# Patient Record
Sex: FEMALE | Race: WHITE | NOT HISPANIC OR LATINO
[De-identification: names, ages, dates, MRNs, and addresses within clinical notes are randomized per-mention and may not be internally consistent; named-entity substitution may affect disease eponyms.]

---

## 2022-03-22 ENCOUNTER — APPOINTMENT (OUTPATIENT)
Dept: RHEUMATOLOGY | Facility: CLINIC | Age: 47
End: 2022-03-22
Payer: COMMERCIAL

## 2022-03-22 VITALS
HEIGHT: 66 IN | BODY MASS INDEX: 26.84 KG/M2 | WEIGHT: 167 LBS | HEART RATE: 71 BPM | OXYGEN SATURATION: 98 % | SYSTOLIC BLOOD PRESSURE: 166 MMHG | TEMPERATURE: 97.9 F | RESPIRATION RATE: 14 BRPM | DIASTOLIC BLOOD PRESSURE: 100 MMHG

## 2022-03-22 DIAGNOSIS — Z82.5 FAMILY HISTORY OF ASTHMA AND OTHER CHRONIC LOWER RESPIRATORY DISEASES: ICD-10-CM

## 2022-03-22 DIAGNOSIS — E11.9 TYPE 2 DIABETES MELLITUS W/OUT COMPLICATIONS: ICD-10-CM

## 2022-03-22 DIAGNOSIS — Z82.61 FAMILY HISTORY OF ARTHRITIS: ICD-10-CM

## 2022-03-22 DIAGNOSIS — E78.5 HYPERLIPIDEMIA, UNSPECIFIED: ICD-10-CM

## 2022-03-22 DIAGNOSIS — Z78.9 OTHER SPECIFIED HEALTH STATUS: ICD-10-CM

## 2022-03-22 DIAGNOSIS — R79.89 OTHER SPECIFIED ABNORMAL FINDINGS OF BLOOD CHEMISTRY: ICD-10-CM

## 2022-03-22 DIAGNOSIS — I10 ESSENTIAL (PRIMARY) HYPERTENSION: ICD-10-CM

## 2022-03-22 DIAGNOSIS — Z11.59 ENCOUNTER FOR SCREENING FOR OTHER VIRAL DISEASES: ICD-10-CM

## 2022-03-22 PROCEDURE — 99204 OFFICE O/P NEW MOD 45 MIN: CPT

## 2022-03-22 RX ORDER — HYDROCHLOROTHIAZIDE 25 MG/1
25 TABLET ORAL
Refills: 0 | Status: ACTIVE | COMMUNITY
Start: 2022-03-22

## 2022-03-22 RX ORDER — MULTIVIT-MIN/FOLIC/VIT K/LYCOP 400-300MCG
25 MCG TABLET ORAL
Refills: 0 | Status: ACTIVE | COMMUNITY
Start: 2022-03-22

## 2022-03-22 RX ORDER — METFORMIN HYDROCHLORIDE 500 MG/1
500 TABLET, COATED ORAL
Refills: 0 | Status: ACTIVE | COMMUNITY
Start: 2022-03-22

## 2022-03-22 RX ORDER — ATORVASTATIN CALCIUM 40 MG/1
40 TABLET, FILM COATED ORAL
Refills: 0 | Status: ACTIVE | COMMUNITY
Start: 2022-03-22

## 2022-03-22 RX ORDER — METOPROLOL TARTRATE 50 MG/1
50 TABLET, FILM COATED ORAL
Refills: 0 | Status: ACTIVE | COMMUNITY
Start: 2022-03-22

## 2022-03-22 NOTE — ASSESSMENT
[FreeTextEntry1] : 45 y/o F w PsO, likely PsA\par =hx of PsO-skin in remission\par =pain, swelling of L wrists, hand, elbow, shoulder, knee\par =dactylitis of L hand? \par =on TNF inhibitor \par \par Pt likely has PsA given clinical presentation. Pt has been on 2 TNF inhibitors, and will likely need another medicaiton with different mechanism of action. IL17 inhibitor would be good candidate (cosentyx, taltz). Will obtain labs to prepare for immunosuppression. Doubt pt has drug induced lupus, but will send for serologies, given use of TNF inhibitor. \par \par Plan\par -Labs w serologies, inflammatory markers\par -Xrays hands/wrists, knees, feet/ankles \par RTO in 2 weeks to discuss dx and treatment plan

## 2022-03-22 NOTE — HISTORY OF PRESENT ILLNESS
[FreeTextEntry1] : Referring physician: Dr. Erick Roberts \par \par 45 y/o F here for initial visit. \par Pt dx w PsO 13 years ago. Pt says all skin was affected. Was initially on enbrel, which did not help. However, pt then developed joint pain 1 month ago. Pt pain is L elbow, L wrist/hand, b/l knee pain. Pt says she also had skin flare. \par Pt was put on medrol, but it only helped her temporarily. Pt was put on meloxicam, naproxen, which did not help.\par Joint pain is worse in am. Pain is worse w activities. She has joint swelling. \par Pt then 1 month ago, started developing joint pain. \par \par Pt on humira for last 6-7 years ago. \par Pt took ebrel for years. It did not work. \par \par No fevers, h/a, red painful, eyes, nail changes,  hair loss, oral ulcers, epistaxis, sinusitis,  swollen glands, dry mouth, dry eyes, CP,  cough, vision changes, abdominal pain, GERD, n/v/d, blood in stool or urine, focal weakness, sensory loss,  Raynaud's, weight loss. \par +pt w difficulty breathing from pain \par +abdominal pain\par +numbness, tinging of L hand

## 2022-03-22 NOTE — CONSULT LETTER
[Dear  ___] : Dear  [unfilled], [Consult Letter:] : I had the pleasure of evaluating your patient, [unfilled]. [Consult Closing:] : Thank you very much for allowing me to participate in the care of this patient.  If you have any questions, please do not hesitate to contact me. [Sincerely,] : Sincerely, [FreeTextEntry2] : Dr. Erick Roberts \par [address], \[place] [address] [FreeTextEntry3] : Kamaljit Groves MD

## 2022-03-22 NOTE — PHYSICAL EXAM
[General Appearance - Well Nourished] : well nourished [General Appearance - Well Developed] : well developed [Sclera] : the sclera and conjunctiva were normal [Auscultation Breath Sounds / Voice Sounds] : lungs were clear to auscultation bilaterally [Heart Sounds] : normal S1 and S2 [Murmurs] : no murmurs [Heart Sounds Gallop] : no gallops [Abdomen Soft] : soft [Heart Sounds Pericardial Friction Rub] : no pericardial rub [Abdomen Tenderness] : non-tender [] : no hepato-splenomegaly [FreeTextEntry1] : no significant PsO lesions. Nails intact.  [Oriented To Time, Place, And Person] : oriented to person, place, and time

## 2022-03-24 DIAGNOSIS — M19.90 UNSPECIFIED OSTEOARTHRITIS, UNSPECIFIED SITE: ICD-10-CM

## 2022-03-24 LAB
25(OH)D3 SERPL-MCNC: 34.2 NG/ML
ALBUMIN SERPL ELPH-MCNC: 4.7 G/DL
ALP BLD-CCNC: 52 U/L
ALT SERPL-CCNC: 15 U/L
ANION GAP SERPL CALC-SCNC: 16 MMOL/L
AST SERPL-CCNC: 12 U/L
BASOPHILS # BLD AUTO: 0.06 K/UL
BASOPHILS NFR BLD AUTO: 0.6 %
BILIRUB SERPL-MCNC: 0.3 MG/DL
BUN SERPL-MCNC: 16 MG/DL
CALCIUM SERPL-MCNC: 10.4 MG/DL
CCP AB SER IA-ACNC: <8 UNITS
CHLORIDE SERPL-SCNC: 95 MMOL/L
CO2 SERPL-SCNC: 26 MMOL/L
CREAT SERPL-MCNC: 0.47 MG/DL
CRP SERPL-MCNC: 26 MG/L
DSDNA AB SER-ACNC: 16 IU/ML
EGFR: 120 ML/MIN/1.73M2
EOSINOPHIL # BLD AUTO: 0.13 K/UL
EOSINOPHIL NFR BLD AUTO: 1.2 %
ERYTHROCYTE [SEDIMENTATION RATE] IN BLOOD BY WESTERGREN METHOD: 70 MM/HR
GLUCOSE SERPL-MCNC: 155 MG/DL
HBV CORE IGG+IGM SER QL: NONREACTIVE
HBV SURFACE AB SER QL: NONREACTIVE
HBV SURFACE AG SER QL: NONREACTIVE
HCT VFR BLD CALC: 40 %
HCV AB SER QL: NONREACTIVE
HCV S/CO RATIO: 0.15 S/CO
HGB BLD-MCNC: 13.2 G/DL
HISTONE AB SER QL: 0.3 UNITS
IMM GRANULOCYTES NFR BLD AUTO: 0.6 %
LYMPHOCYTES # BLD AUTO: 3.2 K/UL
LYMPHOCYTES NFR BLD AUTO: 30.2 %
M TB IFN-G BLD-IMP: NEGATIVE
MAN DIFF?: NORMAL
MCHC RBC-ENTMCNC: 27.8 PG
MCHC RBC-ENTMCNC: 33 GM/DL
MCV RBC AUTO: 84.4 FL
MONOCYTES # BLD AUTO: 0.8 K/UL
MONOCYTES NFR BLD AUTO: 7.6 %
NEUTROPHILS # BLD AUTO: 6.34 K/UL
NEUTROPHILS NFR BLD AUTO: 59.8 %
PLATELET # BLD AUTO: 491 K/UL
POTASSIUM SERPL-SCNC: 4.9 MMOL/L
PROT SERPL-MCNC: 8.5 G/DL
QUANTIFERON TB PLUS MITOGEN MINUS NIL: 9.96 IU/ML
QUANTIFERON TB PLUS NIL: 0.04 IU/ML
QUANTIFERON TB PLUS TB1 MINUS NIL: 0.01 IU/ML
QUANTIFERON TB PLUS TB2 MINUS NIL: 0.01 IU/ML
RBC # BLD: 4.74 M/UL
RBC # FLD: 13.1 %
RF+CCP IGG SER-IMP: NEGATIVE
RHEUMATOID FACT SER QL: 11 IU/ML
SODIUM SERPL-SCNC: 136 MMOL/L
WBC # FLD AUTO: 10.59 K/UL

## 2022-03-29 LAB
ANA PAT FLD IF-IMP: NORMAL
ANA SER IF-ACNC: ABNORMAL

## 2022-03-29 RX ORDER — SECUKINUMAB 150 MG/ML
150 INJECTION SUBCUTANEOUS
Qty: 5 | Refills: 0 | Status: DISCONTINUED | COMMUNITY
Start: 2022-03-24 | End: 2022-03-29

## 2022-03-29 RX ORDER — SECUKINUMAB 150 MG/ML
150 INJECTION SUBCUTANEOUS
Qty: 1 | Refills: 5 | Status: DISCONTINUED | COMMUNITY
Start: 2022-03-24 | End: 2022-03-29

## 2022-04-04 LAB — HLA-B27 RELATED AG QL: NEGATIVE

## 2022-04-12 ENCOUNTER — APPOINTMENT (OUTPATIENT)
Dept: RHEUMATOLOGY | Facility: CLINIC | Age: 47
End: 2022-04-12
Payer: COMMERCIAL

## 2022-04-12 VITALS
RESPIRATION RATE: 14 BRPM | HEART RATE: 71 BPM | OXYGEN SATURATION: 98 % | DIASTOLIC BLOOD PRESSURE: 99 MMHG | TEMPERATURE: 98.1 F | SYSTOLIC BLOOD PRESSURE: 172 MMHG

## 2022-04-12 PROCEDURE — 96372 THER/PROPH/DIAG INJ SC/IM: CPT

## 2022-04-12 NOTE — PROCEDURE
[Today's Date:] : Date: [unfilled] [Patient] : the patient [Risks] : risks [Benefits] : benefits [Therapeutic] : therapeutic [#1 Site: ______] : #1 site identified in the [unfilled] [Alcohol] : alcohol [22 gauge 1.5 inch] : A 22 gauge 1.5 inch needle was used [Tolerated Well] : the patient tolerated the procedure well [No Complications] : there were no complications [Instructions Given] : handouts/patient instructions were given to patient [Patient Instructed to Call] : patient was instructed to call if redness at site, a decrease in range of motion or an increase in pain is noted after procedure. [de-identified] : stelara 45mg -outside pharmacy

## 2022-04-20 ENCOUNTER — NON-APPOINTMENT (OUTPATIENT)
Age: 47
End: 2022-04-20

## 2022-04-20 ENCOUNTER — RX RENEWAL (OUTPATIENT)
Age: 47
End: 2022-04-20

## 2022-05-10 ENCOUNTER — APPOINTMENT (OUTPATIENT)
Dept: RHEUMATOLOGY | Facility: CLINIC | Age: 47
End: 2022-05-10
Payer: COMMERCIAL

## 2022-05-10 VITALS
DIASTOLIC BLOOD PRESSURE: 90 MMHG | HEIGHT: 66 IN | WEIGHT: 171 LBS | HEART RATE: 71 BPM | SYSTOLIC BLOOD PRESSURE: 161 MMHG | BODY MASS INDEX: 27.48 KG/M2 | OXYGEN SATURATION: 98 % | TEMPERATURE: 98.2 F | RESPIRATION RATE: 14 BRPM

## 2022-05-10 PROCEDURE — 99214 OFFICE O/P EST MOD 30 MIN: CPT | Mod: 25

## 2022-05-10 PROCEDURE — 96401 CHEMO ANTI-NEOPL SQ/IM: CPT

## 2022-05-10 NOTE — DATA REVIEWED
[FreeTextEntry1] : Labs reviewed from 3/22\par LUIS ARMANDO 1:320, ESR 70, CRP 29\par WBC 10.5, plt 400s \par RF, CCP, HLAB27 negative \par \par Xrays 3/22\par hands/wrists: mild OA \par knees: mild OA \par feet/ankles: mild OA

## 2022-05-10 NOTE — PROCEDURE
[Today's Date:] : Date: [unfilled] [Patient] : the patient [Risks] : risks [Benefits] : benefits [Therapeutic] : therapeutic [Alcohol] : alcohol [22 gauge 1.5 inch] : A 22 gauge 1.5 inch needle was used [Tolerated Well] : the patient tolerated the procedure well [No Complications] : there were no complications [Instructions Given] : handouts/patient instructions were given to patient [Patient Instructed to Call] : patient was instructed to call if redness at site, a decrease in range of motion or an increase in pain is noted after procedure. [#1 Site: ______] : #1 site identified in the [unfilled] [de-identified] : stelara 45 mg outside pharmacy

## 2022-05-10 NOTE — PHYSICAL EXAM
[General Appearance - Well Nourished] : well nourished [General Appearance - Well Developed] : well developed [Sclera] : the sclera and conjunctiva were normal [Auscultation Breath Sounds / Voice Sounds] : lungs were clear to auscultation bilaterally [Heart Sounds] : normal S1 and S2 [Heart Sounds Gallop] : no gallops [Murmurs] : no murmurs [Heart Sounds Pericardial Friction Rub] : no pericardial rub [Abdomen Soft] : soft [Abdomen Tenderness] : non-tender [] : no hepato-splenomegaly [Musculoskeletal - Swelling] : no joint swelling seen [Oriented To Time, Place, And Person] : oriented to person, place, and time [FreeTextEntry1] : no significant PsO lesions. Nails intact.

## 2022-05-10 NOTE — ASSESSMENT
[FreeTextEntry1] : 47 y/o F w PsO, likely PsA\par =hx of PsO-skin in remission\par =pain, swelling of L wrists, hand, elbow, shoulder, knee\par =dactylitis of L hand\par =labs 4/22 w high ECR, CRP; negative HLAB27, RF, CCP negative\par =Xrays 4/22 w mild hand, knees, feet OA\par =on stelara \par \par Pt improving, but unsure if steroids controlling PsA currently. WIll try to taper furtehr.\par \par Plan \par Labs to measure disease activity and monitor for drug toxicities \par injected stelara per above\par lower prednisone to 7.5mg daily\par naproxen 250 mg QD PRN \par PPI for GI protection \par RTO In 4-6 weeks

## 2022-05-10 NOTE — HISTORY OF PRESENT ILLNESS
[___ Month(s) Ago] : [unfilled] month(s) ago [FreeTextEntry1] : =pt feels well, has decreased swelling and pain of joints\par =pt feels when she stops prednisone, L knee pain returns\par =no active skin lesions\par =red painful eyes, fevers, rashes, psoriasis, back pain, diarrhea, blood in stool

## 2022-05-14 LAB
ALBUMIN SERPL ELPH-MCNC: 4.5 G/DL
ALP BLD-CCNC: 47 U/L
ALT SERPL-CCNC: 24 U/L
ANION GAP SERPL CALC-SCNC: 16 MMOL/L
AST SERPL-CCNC: 17 U/L
BASOPHILS # BLD AUTO: 0.03 K/UL
BASOPHILS NFR BLD AUTO: 0.4 %
BILIRUB SERPL-MCNC: 0.3 MG/DL
BUN SERPL-MCNC: 18 MG/DL
CALCIUM SERPL-MCNC: 9 MG/DL
CHLORIDE SERPL-SCNC: 98 MMOL/L
CO2 SERPL-SCNC: 24 MMOL/L
CREAT SERPL-MCNC: 0.62 MG/DL
CRP SERPL-MCNC: <3 MG/L
EGFR: 113 ML/MIN/1.73M2
EOSINOPHIL # BLD AUTO: 0.06 K/UL
EOSINOPHIL NFR BLD AUTO: 0.8 %
ERYTHROCYTE [SEDIMENTATION RATE] IN BLOOD BY WESTERGREN METHOD: 28 MM/HR
GLUCOSE SERPL-MCNC: 212 MG/DL
HCT VFR BLD CALC: 37.3 %
HGB BLD-MCNC: 12.2 G/DL
IMM GRANULOCYTES NFR BLD AUTO: 0.7 %
LYMPHOCYTES # BLD AUTO: 2.02 K/UL
LYMPHOCYTES NFR BLD AUTO: 27.8 %
MAN DIFF?: NORMAL
MCHC RBC-ENTMCNC: 27.8 PG
MCHC RBC-ENTMCNC: 32.7 GM/DL
MCV RBC AUTO: 85 FL
MONOCYTES # BLD AUTO: 0.57 K/UL
MONOCYTES NFR BLD AUTO: 7.9 %
NEUTROPHILS # BLD AUTO: 4.53 K/UL
NEUTROPHILS NFR BLD AUTO: 62.4 %
PLATELET # BLD AUTO: 359 K/UL
POTASSIUM SERPL-SCNC: 4.3 MMOL/L
PROT SERPL-MCNC: 7.1 G/DL
RBC # BLD: 4.39 M/UL
RBC # FLD: 13.3 %
SODIUM SERPL-SCNC: 138 MMOL/L
WBC # FLD AUTO: 7.26 K/UL

## 2022-05-17 ENCOUNTER — APPOINTMENT (OUTPATIENT)
Dept: RHEUMATOLOGY | Facility: CLINIC | Age: 47
End: 2022-05-17

## 2022-06-07 ENCOUNTER — RX RENEWAL (OUTPATIENT)
Age: 47
End: 2022-06-07

## 2022-06-22 ENCOUNTER — RX RENEWAL (OUTPATIENT)
Age: 47
End: 2022-06-22

## 2022-06-27 ENCOUNTER — APPOINTMENT (OUTPATIENT)
Dept: RHEUMATOLOGY | Facility: CLINIC | Age: 47
End: 2022-06-27

## 2022-07-05 ENCOUNTER — APPOINTMENT (OUTPATIENT)
Dept: RHEUMATOLOGY | Facility: CLINIC | Age: 47
End: 2022-07-05

## 2022-07-06 ENCOUNTER — RX RENEWAL (OUTPATIENT)
Age: 47
End: 2022-07-06

## 2022-08-22 ENCOUNTER — APPOINTMENT (OUTPATIENT)
Dept: RHEUMATOLOGY | Facility: CLINIC | Age: 47
End: 2022-08-22

## 2022-08-22 ENCOUNTER — RX RENEWAL (OUTPATIENT)
Age: 47
End: 2022-08-22

## 2022-08-22 RX ORDER — NAPROXEN 250 MG/1
250 TABLET ORAL
Qty: 60 | Refills: 1 | Status: ACTIVE | COMMUNITY
Start: 2022-04-12 | End: 1900-01-01

## 2022-09-07 ENCOUNTER — RX RENEWAL (OUTPATIENT)
Age: 47
End: 2022-09-07

## 2022-09-19 ENCOUNTER — APPOINTMENT (OUTPATIENT)
Dept: RHEUMATOLOGY | Facility: CLINIC | Age: 47
End: 2022-09-19

## 2022-09-19 VITALS
BODY MASS INDEX: 26.2 KG/M2 | OXYGEN SATURATION: 98 % | HEIGHT: 66 IN | TEMPERATURE: 97.9 F | WEIGHT: 163 LBS | SYSTOLIC BLOOD PRESSURE: 158 MMHG | DIASTOLIC BLOOD PRESSURE: 96 MMHG | HEART RATE: 72 BPM | RESPIRATION RATE: 15 BRPM

## 2022-09-19 PROCEDURE — 99214 OFFICE O/P EST MOD 30 MIN: CPT

## 2022-09-19 RX ORDER — PREDNISONE 5 MG/1
5 TABLET ORAL
Qty: 100 | Refills: 1 | Status: ACTIVE | COMMUNITY
Start: 2022-09-19 | End: 1900-01-01

## 2022-09-19 NOTE — PHYSICAL EXAM
[General Appearance - Well Nourished] : well nourished [General Appearance - Well Developed] : well developed [Sclera] : the sclera and conjunctiva were normal [Auscultation Breath Sounds / Voice Sounds] : lungs were clear to auscultation bilaterally [Heart Sounds] : normal S1 and S2 [Heart Sounds Gallop] : no gallops [Murmurs] : no murmurs [Heart Sounds Pericardial Friction Rub] : no pericardial rub [Abdomen Soft] : soft [Abdomen Tenderness] : non-tender [] : no hepato-splenomegaly [Musculoskeletal - Swelling] : no joint swelling seen [FreeTextEntry1] : no significant PsO lesions. Nails intact.  [Oriented To Time, Place, And Person] : oriented to person, place, and time

## 2022-09-19 NOTE — HISTORY OF PRESENT ILLNESS
[___ Month(s) Ago] : [unfilled] month(s) ago [FreeTextEntry1] : =pt reports pain in L knee, espeically when she is going up \par =pt also when she bends down, has pain \par =says also in L wrist and L elbow \par =pt had stelara in August, 2022\par =pt says that her vaginal area has psoriasis \par =pt taking prednisone 10mg dailyi \par =no fevers, SOB, CP, abdominal pain, n/v/d, rashes

## 2022-09-19 NOTE — ASSESSMENT
[FreeTextEntry1] : 45 y/o F w PsO, likely PsA\par =hx of PsO-skin in remission\par =pain, swelling of L wrists, hand, elbow, shoulder, knee\par =dactylitis of L hand\par =labs 4/22 w high ECR, CRP; negative HLAB27, RF, CCP negative\par =Xrays 4/22 w mild hand, knees, feet OA\par =on stelara \par \par Does not seem patient psoriatic dz is controlled w stelara. Will add MTX. Explained MTX side effects, including oral ulcers, hair loss, especially if folic acid not taken daily. Explained need to take MTX. Also explained GI upset at beginning. Also explained MTX lowers your immune system and you are at higher risk for infections, malignancy. Also rare side effect of pneumonitis, nodules. \par \par Plan \par Labs to measure disease activity and monitor for drug toxicities \par c/w stelara every 3 months, next due 11/22\par c/w prednisone 10mg daily for now..\par MTX 4 tabs weekly X 2 weeks, then 5 tabs weekly \par folic acid 1mg daily  \par RTO 6 week

## 2022-09-28 DIAGNOSIS — K29.70 GASTRITIS, UNSPECIFIED, W/OUT BLEEDING: ICD-10-CM

## 2022-09-28 RX ORDER — ESOMEPRAZOLE MAGNESIUM 40 MG/1
40 CAPSULE, DELAYED RELEASE ORAL
Refills: 0 | Status: DISCONTINUED | COMMUNITY
Start: 2022-03-22 | End: 2022-09-28

## 2022-09-30 ENCOUNTER — NON-APPOINTMENT (OUTPATIENT)
Age: 47
End: 2022-09-30

## 2022-09-30 RX ORDER — USTEKINUMAB 45 MG/.5ML
45 INJECTION, SOLUTION SUBCUTANEOUS
Qty: 3 | Refills: 2 | Status: DISCONTINUED | COMMUNITY
Start: 2022-03-29 | End: 2022-09-30

## 2022-11-12 ENCOUNTER — APPOINTMENT (OUTPATIENT)
Dept: RHEUMATOLOGY | Facility: CLINIC | Age: 47
End: 2022-11-12

## 2022-11-12 VITALS
BODY MASS INDEX: 27.32 KG/M2 | TEMPERATURE: 97.6 F | DIASTOLIC BLOOD PRESSURE: 79 MMHG | HEART RATE: 73 BPM | RESPIRATION RATE: 16 BRPM | OXYGEN SATURATION: 98 % | WEIGHT: 170 LBS | SYSTOLIC BLOOD PRESSURE: 121 MMHG | HEIGHT: 66 IN

## 2022-11-12 DIAGNOSIS — Z23 ENCOUNTER FOR IMMUNIZATION: ICD-10-CM

## 2022-11-12 PROCEDURE — G0008: CPT

## 2022-11-12 PROCEDURE — 90662 IIV NO PRSV INCREASED AG IM: CPT

## 2022-11-12 PROCEDURE — 99214 OFFICE O/P EST MOD 30 MIN: CPT | Mod: 25

## 2022-11-12 NOTE — HISTORY OF PRESENT ILLNESS
[___ Month(s) Ago] : [unfilled] month(s) ago [FreeTextEntry1] : =pt says that she got loading dose of cosentyx 150mg q week X 4, and now on every 4 weeks \par =pt taking MTX 5 tabs folic \par =pain in hands, legs\par =pt losing hair currently \par =pt says skin is clear except vaginal area\par =no fevers, SOB, CP, abdominal pain, n/v/d, rashes

## 2022-11-12 NOTE — ASSESSMENT
[FreeTextEntry1] : 46 y/o F w PsO, likely PsA\par =hx of PsO\par =pain, swelling of L wrists, hand, elbow, shoulder, knee\par =dactylitis of L hand\par =labs 4/22 w high ECR, CRP; negative HLAB27, RF, CCP negative\par =Xrays 4/22 w mild hand, knees, feet OA\par \par \par Unsure if pt has active PsA, last inflammatory markers normal; pt however not changed therapy to IL17 inhibitor. Will obtain inflammatory markers to assess. \par Pt hesitant to continue MTX, given hair loss. Will d/c. \par \par Plan \par Labs to measure disease activity and monitor for drug toxicities \par c/w coccentyx 150mg [id number]\par c/w prednisone 7.5mg daily for now\par stop MTX for now \par folic acid 1mg daily  \par influenza vaccine \par RTO 2 months

## 2022-11-14 ENCOUNTER — RX RENEWAL (OUTPATIENT)
Age: 47
End: 2022-11-14

## 2022-11-16 ENCOUNTER — NON-APPOINTMENT (OUTPATIENT)
Age: 47
End: 2022-11-16

## 2022-11-17 ENCOUNTER — RX RENEWAL (OUTPATIENT)
Age: 47
End: 2022-11-17

## 2022-11-22 ENCOUNTER — NON-APPOINTMENT (OUTPATIENT)
Age: 47
End: 2022-11-22

## 2022-11-28 ENCOUNTER — RX RENEWAL (OUTPATIENT)
Age: 47
End: 2022-11-28

## 2023-01-14 ENCOUNTER — APPOINTMENT (OUTPATIENT)
Dept: RHEUMATOLOGY | Facility: CLINIC | Age: 48
End: 2023-01-14
Payer: COMMERCIAL

## 2023-01-14 VITALS
BODY MASS INDEX: 27 KG/M2 | RESPIRATION RATE: 16 BRPM | SYSTOLIC BLOOD PRESSURE: 144 MMHG | HEIGHT: 66 IN | DIASTOLIC BLOOD PRESSURE: 86 MMHG | HEART RATE: 68 BPM | WEIGHT: 168 LBS | OXYGEN SATURATION: 97 %

## 2023-01-14 PROCEDURE — 99214 OFFICE O/P EST MOD 30 MIN: CPT

## 2023-01-14 RX ORDER — EMPAGLIFLOZIN 10 MG/1
10 TABLET, FILM COATED ORAL
Refills: 0 | Status: ACTIVE | COMMUNITY
Start: 2023-01-14

## 2023-01-14 RX ORDER — AMLODIPINE BESYLATE 5 MG/1
5 TABLET ORAL
Refills: 0 | Status: ACTIVE | COMMUNITY
Start: 2023-01-14

## 2023-01-14 RX ORDER — SECUKINUMAB 150 MG/ML
150 INJECTION SUBCUTANEOUS
Qty: 3 | Refills: 1 | Status: DISCONTINUED | COMMUNITY
Start: 2022-11-12 | End: 2023-01-14

## 2023-01-14 NOTE — ASSESSMENT
[FreeTextEntry1] : 46 y/o F w PsO, likely PsA\par =hx of PsO\par =pain, swelling of L wrists, hand, elbow, shoulder, knee\par =dactylitis of L hand\par =labs 4/22 w high ECR, CRP; negative HLAB27, RF, CCP negative\par =Xrays 4/22 w mild hand, knees, feet OA\par \par Pt inflammatory markers improved....Unsure of efficacy of IL17, as pt w inconsistent use. Will use taltz as preferred by insurance. Will see if effective for patient. \par \par Plan \par rx taltz 180mg once, then 80mg every 4 weeks\par c/w prednisone 7.5mg daily for now\par RTO 1-2 months

## 2023-01-14 NOTE — HISTORY OF PRESENT ILLNESS
[FreeTextEntry1] : =pt stopped taking cosentyx in 11/22, as pt ran out of medication\par =pt says she still has joint pain and has psoriatic lesions in vaginal area\par =no fevers, SOB, CP, abdominal pain, n/v/d, rashes

## 2023-01-14 NOTE — PHYSICAL EXAM
[General Appearance - Well Nourished] : well nourished [General Appearance - Well Developed] : well developed [Sclera] : the sclera and conjunctiva were normal [Auscultation Breath Sounds / Voice Sounds] : lungs were clear to auscultation bilaterally [Heart Sounds] : normal S1 and S2 [Heart Sounds Gallop] : no gallops [Murmurs] : no murmurs [Heart Sounds Pericardial Friction Rub] : no pericardial rub [Abdomen Soft] : soft [Abdomen Tenderness] : non-tender [] : no hepato-splenomegaly [FreeTextEntry1] : no significant PsO lesions. Nails intact.  [Oriented To Time, Place, And Person] : oriented to person, place, and time

## 2023-01-17 RX ORDER — SECUKINUMAB 150 MG/ML
150 INJECTION SUBCUTANEOUS
Refills: 0 | Status: DISCONTINUED | COMMUNITY
Start: 2022-09-30 | End: 2023-01-17

## 2023-03-06 ENCOUNTER — RX RENEWAL (OUTPATIENT)
Age: 48
End: 2023-03-06

## 2023-03-11 ENCOUNTER — APPOINTMENT (OUTPATIENT)
Dept: RHEUMATOLOGY | Facility: CLINIC | Age: 48
End: 2023-03-11
Payer: COMMERCIAL

## 2023-03-11 VITALS
DIASTOLIC BLOOD PRESSURE: 84 MMHG | HEIGHT: 66 IN | BODY MASS INDEX: 27.08 KG/M2 | WEIGHT: 168.5 LBS | HEART RATE: 77 BPM | SYSTOLIC BLOOD PRESSURE: 144 MMHG | OXYGEN SATURATION: 97 % | RESPIRATION RATE: 16 BRPM

## 2023-03-11 DIAGNOSIS — E55.9 VITAMIN D DEFICIENCY, UNSPECIFIED: ICD-10-CM

## 2023-03-11 PROCEDURE — 99214 OFFICE O/P EST MOD 30 MIN: CPT

## 2023-03-22 ENCOUNTER — RX RENEWAL (OUTPATIENT)
Age: 48
End: 2023-03-22

## 2023-03-22 RX ORDER — FOLIC ACID 1 MG/1
1 TABLET ORAL DAILY
Qty: 90 | Refills: 1 | Status: ACTIVE | COMMUNITY
Start: 2022-09-19 | End: 1900-01-01

## 2023-04-07 NOTE — PHYSICAL EXAM
[General Appearance - Well Nourished] : well nourished [Sclera] : the sclera and conjunctiva were normal [General Appearance - Well Developed] : well developed [Auscultation Breath Sounds / Voice Sounds] : lungs were clear to auscultation bilaterally [Heart Sounds] : normal S1 and S2 [Heart Sounds Gallop] : no gallops [Murmurs] : no murmurs [Heart Sounds Pericardial Friction Rub] : no pericardial rub [Abdomen Soft] : soft [Abdomen Tenderness] : non-tender [] : no hepato-splenomegaly [Oriented To Time, Place, And Person] : oriented to person, place, and time [FreeTextEntry1] : thin plaque on L heel, w no erythema, mod scalling

## 2023-04-07 NOTE — ASSESSMENT
[FreeTextEntry1] : 48 y/o F w PsO, likely PsA\par =hx of PsO\par =pain, swelling of L wrists, hand, elbow, shoulder, knee\par =dactylitis of L hand\par =labs 4/22 w high ECR, CRP; negative HLAB27, RF, CCP negative\par =Xrays 4/22 w mild hand, knees, feet OA\par \par Pt symptoms have significantly improved, as well as inflammatory markers. Reports vaginal psoriasis. Can increase taltz to 80mg every 2 weeks if psoriatic dz not controlled in 1 month. \par \par Counseled about vaccines, as pt is at higher risk for severe infection given immunosuppressed state. Pt understood but refused all vaccines including pneumococcal and shingles. \par \par Plan \par taltz 80mg q 4 weeks\par c/w prednisone 7.5mg  \par RTO 1 month

## 2023-04-07 NOTE — HISTORY OF PRESENT ILLNESS
[___ Month(s) Ago] : [unfilled] month(s) ago [FreeTextEntry1] : =pt stopped taking cosentyx in 11/22, as pt ran out of medication\par =pt says she still has joint pain and has psoriatic lesions in vaginal area\par =no fevers, SOB, CP, abdominal pain, n/v/d, rashes

## 2023-05-13 ENCOUNTER — APPOINTMENT (OUTPATIENT)
Dept: RHEUMATOLOGY | Facility: CLINIC | Age: 48
End: 2023-05-13
Payer: COMMERCIAL

## 2023-05-13 VITALS
HEART RATE: 75 BPM | WEIGHT: 168 LBS | OXYGEN SATURATION: 97 % | DIASTOLIC BLOOD PRESSURE: 76 MMHG | SYSTOLIC BLOOD PRESSURE: 129 MMHG | HEIGHT: 66 IN | RESPIRATION RATE: 16 BRPM | TEMPERATURE: 98 F | BODY MASS INDEX: 27 KG/M2

## 2023-05-13 PROCEDURE — 99214 OFFICE O/P EST MOD 30 MIN: CPT

## 2023-05-13 NOTE — PHYSICAL EXAM
[General Appearance - Well Nourished] : well nourished [General Appearance - Well Developed] : well developed [Sclera] : the sclera and conjunctiva were normal [Auscultation Breath Sounds / Voice Sounds] : lungs were clear to auscultation bilaterally [Heart Sounds Gallop] : no gallops [Heart Sounds] : normal S1 and S2 [Murmurs] : no murmurs [Heart Sounds Pericardial Friction Rub] : no pericardial rub [Abdomen Tenderness] : non-tender [Abdomen Soft] : soft [] : no hepato-splenomegaly [FreeTextEntry1] : thin plaque on L heel, w no erythema, mod scalling  [Oriented To Time, Place, And Person] : oriented to person, place, and time

## 2023-05-13 NOTE — ASSESSMENT
[FreeTextEntry1] : 46 y/o F w PsO, likely PsA\par =hx of PsO\par =pain, swelling of L wrists, hand, elbow, shoulder, knee\par =dactylitis of L hand\par =labs 4/22 w high ECR, CRP; negative HLAB27, RF, CCP negative\par =Xrays 4/22 w mild hand, knees, feet OA\par \par Pt still w active psoriatic dz, both msk and skin. Will increase taltz frequency if labs ok. \par \par Plan \par Labs w inflammatory markers \par taltz 80mg q 4 weeks\par c/w prednisone 7.5mg  \par RTO 1 month

## 2023-05-26 ENCOUNTER — RX RENEWAL (OUTPATIENT)
Age: 48
End: 2023-05-26

## 2023-07-31 ENCOUNTER — APPOINTMENT (OUTPATIENT)
Dept: RHEUMATOLOGY | Facility: CLINIC | Age: 48
End: 2023-07-31
Payer: COMMERCIAL

## 2023-08-02 ENCOUNTER — APPOINTMENT (OUTPATIENT)
Dept: RHEUMATOLOGY | Facility: CLINIC | Age: 48
End: 2023-08-02
Payer: COMMERCIAL

## 2023-08-02 VITALS
DIASTOLIC BLOOD PRESSURE: 79 MMHG | HEART RATE: 82 BPM | RESPIRATION RATE: 15 BRPM | BODY MASS INDEX: 26.36 KG/M2 | OXYGEN SATURATION: 99 % | SYSTOLIC BLOOD PRESSURE: 143 MMHG | TEMPERATURE: 97.9 F | WEIGHT: 164 LBS | HEIGHT: 66 IN

## 2023-08-02 PROCEDURE — 99214 OFFICE O/P EST MOD 30 MIN: CPT

## 2023-08-02 RX ORDER — METHOTREXATE 2.5 MG/1
2.5 TABLET ORAL
Qty: 20 | Refills: 1 | Status: DISCONTINUED | COMMUNITY
Start: 2022-09-19 | End: 2023-08-02

## 2023-08-02 NOTE — ASSESSMENT
[FreeTextEntry1] : 48 y/o F w PsO, likely PsA =hx of PsO =pain, swelling of L wrists, hand, elbow, shoulder, knee =dactylitis of L hand =labs 4/22 w high ECR, CRP; negative HLAB27, RF, CCP negative =Xrays 4/22 w mild hand, knees, feet OA  Pt seems in remission. Will c/w current regiment if labs ok. Will consider tapering off prednisone.   Plan  Labs w inflammatory markers  taltz 80mg q 4 weeks c/w prednisone 5mg daily  RTO 1 month

## 2023-08-02 NOTE — HISTORY OF PRESENT ILLNESS
[___ Month(s) Ago] : [unfilled] month(s) ago [FreeTextEntry1] : 48 y/o F PsO, PsA\par  \par  #PsO/PsA\par  =dx w PsO 2009 w all skin affected\par  =2022 developed pain, swelling of L elbow, L wrist/hand, b/l knee pain.\par  =labs 3/22 w ESR 70s, CRP 20s; HLAB27, RF, CCP negative\par  =Xrays 3/22\par  hands/wrists: mild OA \par  knees: mild OA \par  feet/ankles: mild OA\par  =enbrel, humira, stelara failure\par  =MTX: hair loss\par  =started coccentyx 10/22 but inconsistent, as pt getting free samples from derm; changed to talts 1/23\par

## 2023-08-02 NOTE — PHYSICAL EXAM
[General Appearance - Well Nourished] : well nourished [General Appearance - Well Developed] : well developed [Sclera] : the sclera and conjunctiva were normal [Auscultation Breath Sounds / Voice Sounds] : lungs were clear to auscultation bilaterally [Heart Sounds Gallop] : no gallops [Heart Sounds] : normal S1 and S2 [Murmurs] : no murmurs [Heart Sounds Pericardial Friction Rub] : no pericardial rub [Abdomen Soft] : soft [Abdomen Tenderness] : non-tender [Musculoskeletal - Swelling] : no joint swelling seen [] : no rash [Skin Lesions] : no skin lesions [Oriented To Time, Place, And Person] : oriented to person, place, and time

## 2023-08-16 LAB
ALBUMIN SERPL ELPH-MCNC: 4.9 G/DL
ALP BLD-CCNC: 48 U/L
ALT SERPL-CCNC: 33 U/L
ANION GAP SERPL CALC-SCNC: 15 MMOL/L
AST SERPL-CCNC: 29 U/L
BILIRUB SERPL-MCNC: 0.4 MG/DL
BUN SERPL-MCNC: 14 MG/DL
CALCIUM SERPL-MCNC: 10.2 MG/DL
CHLORIDE SERPL-SCNC: 96 MMOL/L
CO2 SERPL-SCNC: 25 MMOL/L
CREAT SERPL-MCNC: 0.49 MG/DL
CRP SERPL-MCNC: <3 MG/L
EGFR: 118 ML/MIN/1.73M2
ERYTHROCYTE [SEDIMENTATION RATE] IN BLOOD BY WESTERGREN METHOD: 46 MM/HR
GLUCOSE SERPL-MCNC: 153 MG/DL
HAV IGM SER QL: NONREACTIVE
HBV CORE IGG+IGM SER QL: NONREACTIVE
HBV CORE IGM SER QL: NONREACTIVE
HBV SURFACE AG SER QL: NONREACTIVE
HCV AB SER QL: NONREACTIVE
HCV S/CO RATIO: 0.16 S/CO
POTASSIUM SERPL-SCNC: 4.7 MMOL/L
PROT SERPL-MCNC: 8.2 G/DL
SODIUM SERPL-SCNC: 137 MMOL/L

## 2023-09-01 ENCOUNTER — RX RENEWAL (OUTPATIENT)
Age: 48
End: 2023-09-01

## 2023-10-21 ENCOUNTER — APPOINTMENT (OUTPATIENT)
Dept: RHEUMATOLOGY | Facility: CLINIC | Age: 48
End: 2023-10-21
Payer: COMMERCIAL

## 2023-10-21 VITALS
SYSTOLIC BLOOD PRESSURE: 127 MMHG | HEART RATE: 63 BPM | RESPIRATION RATE: 16 BRPM | OXYGEN SATURATION: 98 % | BODY MASS INDEX: 25.71 KG/M2 | HEIGHT: 66 IN | TEMPERATURE: 98.4 F | DIASTOLIC BLOOD PRESSURE: 81 MMHG | WEIGHT: 160 LBS

## 2023-10-21 DIAGNOSIS — R35.0 FREQUENCY OF MICTURITION: ICD-10-CM

## 2023-10-21 PROCEDURE — 90662 IIV NO PRSV INCREASED AG IM: CPT

## 2023-10-21 PROCEDURE — 99214 OFFICE O/P EST MOD 30 MIN: CPT | Mod: 25

## 2023-10-21 PROCEDURE — G0008: CPT

## 2023-11-15 ENCOUNTER — RX RENEWAL (OUTPATIENT)
Age: 48
End: 2023-11-15

## 2023-12-04 ENCOUNTER — RX RENEWAL (OUTPATIENT)
Age: 48
End: 2023-12-04

## 2023-12-04 RX ORDER — ESOMEPRAZOLE MAGNESIUM 40 MG/1
40 CAPSULE, DELAYED RELEASE ORAL
Qty: 90 | Refills: 0 | Status: ACTIVE | COMMUNITY
Start: 2022-11-12 | End: 1900-01-01

## 2023-12-09 ENCOUNTER — APPOINTMENT (OUTPATIENT)
Dept: RHEUMATOLOGY | Facility: CLINIC | Age: 48
End: 2023-12-09
Payer: COMMERCIAL

## 2023-12-09 VITALS
HEART RATE: 72 BPM | HEIGHT: 66 IN | DIASTOLIC BLOOD PRESSURE: 74 MMHG | OXYGEN SATURATION: 98 % | RESPIRATION RATE: 16 BRPM | BODY MASS INDEX: 26.52 KG/M2 | WEIGHT: 165 LBS | SYSTOLIC BLOOD PRESSURE: 134 MMHG | TEMPERATURE: 98.3 F

## 2023-12-09 PROCEDURE — 99214 OFFICE O/P EST MOD 30 MIN: CPT

## 2023-12-09 RX ORDER — PREDNISONE 5 MG/1
5 TABLET ORAL
Qty: 90 | Refills: 3 | Status: ACTIVE | COMMUNITY
Start: 2022-03-24 | End: 1900-01-01

## 2023-12-09 RX ORDER — CHOLECALCIFEROL (VITAMIN D3) 125 MCG
50 MCG TABLET ORAL
Qty: 90 | Refills: 2 | Status: ACTIVE | COMMUNITY
Start: 2023-05-13 | End: 1900-01-01

## 2023-12-09 RX ORDER — FAMOTIDINE 20 MG/1
20 TABLET, FILM COATED ORAL
Qty: 90 | Refills: 2 | Status: ACTIVE | COMMUNITY
Start: 2022-09-28 | End: 1900-01-01

## 2024-03-06 ENCOUNTER — RX RENEWAL (OUTPATIENT)
Age: 49
End: 2024-03-06

## 2024-05-20 ENCOUNTER — RX RENEWAL (OUTPATIENT)
Age: 49
End: 2024-05-20

## 2024-05-28 RX ORDER — IXEKIZUMAB 80 MG/ML
80 INJECTION, SOLUTION SUBCUTANEOUS
Qty: 1 | Refills: 1 | Status: ACTIVE | COMMUNITY
Start: 2023-01-14

## 2024-06-19 ENCOUNTER — APPOINTMENT (OUTPATIENT)
Dept: RHEUMATOLOGY | Facility: CLINIC | Age: 49
End: 2024-06-19
Payer: COMMERCIAL

## 2024-06-19 VITALS
SYSTOLIC BLOOD PRESSURE: 135 MMHG | BODY MASS INDEX: 25.88 KG/M2 | HEART RATE: 68 BPM | HEIGHT: 66 IN | OXYGEN SATURATION: 97 % | TEMPERATURE: 98.3 F | DIASTOLIC BLOOD PRESSURE: 79 MMHG | RESPIRATION RATE: 15 BRPM | WEIGHT: 161 LBS

## 2024-06-19 DIAGNOSIS — Z79.899 OTHER LONG TERM (CURRENT) DRUG THERAPY: ICD-10-CM

## 2024-06-19 DIAGNOSIS — I25.10 ATHEROSCLEROTIC HEART DISEASE OF NATIVE CORONARY ARTERY W/OUT ANGINA PECTORIS: ICD-10-CM

## 2024-06-19 PROCEDURE — G2211 COMPLEX E/M VISIT ADD ON: CPT | Mod: NC

## 2024-06-19 PROCEDURE — 99214 OFFICE O/P EST MOD 30 MIN: CPT

## 2024-06-19 RX ORDER — ASPIRIN 325 MG/1
325 TABLET, FILM COATED ORAL
Refills: 0 | Status: ACTIVE | COMMUNITY
Start: 2024-06-19

## 2024-06-19 RX ORDER — CHLORHEXIDINE GLUCONATE 4 %
LIQUID (ML) TOPICAL DAILY
Qty: 30 | Refills: 0 | Status: ACTIVE | COMMUNITY
Start: 2024-06-19 | End: 1900-01-01

## 2024-06-19 RX ORDER — GLIPIZIDE 10 MG/1
10 TABLET ORAL DAILY
Qty: 30 | Refills: 0 | Status: ACTIVE | COMMUNITY
Start: 2022-03-22 | End: 1900-01-01

## 2024-06-19 RX ORDER — LOSARTAN POTASSIUM 50 MG/1
50 TABLET, FILM COATED ORAL
Refills: 0 | Status: ACTIVE | COMMUNITY
Start: 2024-06-19

## 2024-06-19 RX ORDER — EMPAGLIFLOZIN 10 MG/1
10 TABLET, FILM COATED ORAL DAILY
Qty: 30 | Refills: 0 | Status: ACTIVE | COMMUNITY
Start: 2024-06-19 | End: 1900-01-01

## 2024-06-19 NOTE — PHYSICAL EXAM
[General Appearance - Well Nourished] : well nourished [General Appearance - Well Developed] : well developed [Sclera] : the sclera and conjunctiva were normal [Auscultation Breath Sounds / Voice Sounds] : lungs were clear to auscultation bilaterally [Heart Sounds] : normal S1 and S2 [Heart Sounds Gallop] : no gallops [Murmurs] : no murmurs [Heart Sounds Pericardial Friction Rub] : no pericardial rub [Abdomen Soft] : soft [Abdomen Tenderness] : non-tender [] : no hepato-splenomegaly [FreeTextEntry1] : plantar psoriasis  [Oriented To Time, Place, And Person] : oriented to person, place, and time [Musculoskeletal - Swelling] : no joint swelling seen

## 2024-06-19 NOTE — ASSESSMENT
[FreeTextEntry1] : 49 y/o F w PsO, likely PsA =hx of PsO =pain, swelling of L wrists, hand, elbow, shoulder, knee =dactylitis of L hand =labs 4/22 w high ECR, CRP; negative HLAB27, RF, CCP negative =Xrays 4/22 w mild hand, knees, feet OA  I do not think pt has hair loss 2/2 to leflunomide, as pt has hair loss complaints before leflunomide. Given psoriatic dz not controlled, can consider switching to consentyx for more dosage variability (pt was not approved for higher frequency in talts) vs increasing leflunomide (and monitor for hair loss).    Pt not candidate for JAKSTAT inhibitors, as pt appears to have significant CAD, and also w uncontrolled diabetes.   Plan  Labs to measure disease activity and monitor for drug toxicities  taltz 80mg q 4 weeks for now leflunomide 10mg daily for now RTO 9/24 as pt going to [place], but will make changes beforehand and possibly labs

## 2024-06-19 NOTE — HISTORY OF PRESENT ILLNESS
[___ Month(s) Ago] : [unfilled] month(s) ago [FreeTextEntry1] : =pt taking leflunomide 10mg daily, taltz 40mg q 4 weeks =pt getting psoriasis in feet; reports hair loss =pt says she has pain in hands, swelling =pt takes tylenol  =pt w active plantar psoriasis  =no fevers, SOB, CP, abdominal pain, n/v/d, rashes

## 2024-07-01 DIAGNOSIS — L40.9 PSORIASIS, UNSPECIFIED: ICD-10-CM

## 2024-07-01 DIAGNOSIS — L40.50 ARTHROPATHIC PSORIASIS, UNSPECIFIED: ICD-10-CM

## 2024-07-01 LAB
ALBUMIN SERPL ELPH-MCNC: 4.8 G/DL
ALP BLD-CCNC: 44 U/L
ALT SERPL-CCNC: 20 U/L
ANION GAP SERPL CALC-SCNC: 14 MMOL/L
AST SERPL-CCNC: 21 U/L
BASOPHILS # BLD AUTO: 0.06 K/UL
BASOPHILS NFR BLD AUTO: 0.7 %
BILIRUB SERPL-MCNC: 0.5 MG/DL
BUN SERPL-MCNC: 15 MG/DL
CALCIUM SERPL-MCNC: 10.4 MG/DL
CHLORIDE SERPL-SCNC: 97 MMOL/L
CO2 SERPL-SCNC: 26 MMOL/L
CREAT SERPL-MCNC: 0.53 MG/DL
CRP SERPL-MCNC: 4 MG/L
EGFR: 115 ML/MIN/1.73M2
EOSINOPHIL # BLD AUTO: 0.12 K/UL
EOSINOPHIL NFR BLD AUTO: 1.4 %
ERYTHROCYTE [SEDIMENTATION RATE] IN BLOOD BY WESTERGREN METHOD: 16 MM/HR
GLUCOSE SERPL-MCNC: 70 MG/DL
HBV CORE IGG+IGM SER QL: NONREACTIVE
HBV SURFACE AB SER QL: NONREACTIVE
HBV SURFACE AG SER QL: NONREACTIVE
HCT VFR BLD CALC: 39.8 %
HCV AB SER QL: NONREACTIVE
HCV S/CO RATIO: 0.11 S/CO
HGB BLD-MCNC: 12.4 G/DL
IMM GRANULOCYTES NFR BLD AUTO: 0.3 %
LYMPHOCYTES # BLD AUTO: 2.09 K/UL
LYMPHOCYTES NFR BLD AUTO: 24.2 %
M TB IFN-G BLD-IMP: NEGATIVE
MAN DIFF?: NORMAL
MCHC RBC-ENTMCNC: 25.1 PG
MCHC RBC-ENTMCNC: 31.2 GM/DL
MCV RBC AUTO: 80.6 FL
MONOCYTES # BLD AUTO: 0.74 K/UL
MONOCYTES NFR BLD AUTO: 8.6 %
NEUTROPHILS # BLD AUTO: 5.59 K/UL
NEUTROPHILS NFR BLD AUTO: 64.8 %
PLATELET # BLD AUTO: 382 K/UL
POTASSIUM SERPL-SCNC: 3.9 MMOL/L
PROT SERPL-MCNC: 7.8 G/DL
QUANTIFERON TB PLUS MITOGEN MINUS NIL: >10 IU/ML
QUANTIFERON TB PLUS NIL: 0.13 IU/ML
QUANTIFERON TB PLUS TB1 MINUS NIL: 0.02 IU/ML
QUANTIFERON TB PLUS TB2 MINUS NIL: 0 IU/ML
RBC # BLD: 4.94 M/UL
RBC # FLD: 14.3 %
SODIUM SERPL-SCNC: 138 MMOL/L
WBC # FLD AUTO: 8.63 K/UL

## 2024-07-01 RX ORDER — LEFLUNOMIDE 20 MG/1
20 TABLET, FILM COATED ORAL
Qty: 90 | Refills: 0 | Status: ACTIVE | COMMUNITY
Start: 2024-07-01 | End: 1900-01-01

## 2024-07-15 ENCOUNTER — RX RENEWAL (OUTPATIENT)
Age: 49
End: 2024-07-15

## 2024-08-08 ENCOUNTER — RX RENEWAL (OUTPATIENT)
Age: 49
End: 2024-08-08

## 2024-09-30 ENCOUNTER — RX RENEWAL (OUTPATIENT)
Age: 49
End: 2024-09-30

## 2024-10-07 ENCOUNTER — APPOINTMENT (OUTPATIENT)
Dept: RHEUMATOLOGY | Facility: CLINIC | Age: 49
End: 2024-10-07

## 2024-10-07 VITALS
RESPIRATION RATE: 14 BRPM | DIASTOLIC BLOOD PRESSURE: 69 MMHG | SYSTOLIC BLOOD PRESSURE: 149 MMHG | HEART RATE: 69 BPM | TEMPERATURE: 98.1 F | OXYGEN SATURATION: 98 %

## 2024-10-07 DIAGNOSIS — Z79.899 OTHER LONG TERM (CURRENT) DRUG THERAPY: ICD-10-CM

## 2024-10-07 DIAGNOSIS — L40.50 ARTHROPATHIC PSORIASIS, UNSPECIFIED: ICD-10-CM

## 2024-10-07 DIAGNOSIS — L40.9 PSORIASIS, UNSPECIFIED: ICD-10-CM

## 2024-10-07 PROCEDURE — 99214 OFFICE O/P EST MOD 30 MIN: CPT | Mod: 25

## 2024-10-07 PROCEDURE — G0008: CPT

## 2024-10-07 PROCEDURE — 90662 IIV NO PRSV INCREASED AG IM: CPT

## 2024-10-16 LAB
ALBUMIN SERPL ELPH-MCNC: 4.7 G/DL
ALP BLD-CCNC: 38 U/L
ALT SERPL-CCNC: 25 U/L
ANION GAP SERPL CALC-SCNC: 16 MMOL/L
AST SERPL-CCNC: 20 U/L
BASOPHILS # BLD AUTO: 0.06 K/UL
BASOPHILS NFR BLD AUTO: 0.9 %
BILIRUB SERPL-MCNC: 0.4 MG/DL
BUN SERPL-MCNC: 15 MG/DL
CALCIUM SERPL-MCNC: 9.6 MG/DL
CHLORIDE SERPL-SCNC: 96 MMOL/L
CO2 SERPL-SCNC: 23 MMOL/L
CREAT SERPL-MCNC: 0.47 MG/DL
CRP SERPL-MCNC: <3 MG/L
EGFR: 118 ML/MIN/1.73M2
EOSINOPHIL # BLD AUTO: 0.02 K/UL
EOSINOPHIL NFR BLD AUTO: 0.3 %
ERYTHROCYTE [SEDIMENTATION RATE] IN BLOOD BY WESTERGREN METHOD: 29 MM/HR
GLUCOSE SERPL-MCNC: 154 MG/DL
HCT VFR BLD CALC: 38.6 %
HGB BLD-MCNC: 12.2 G/DL
IMM GRANULOCYTES NFR BLD AUTO: 0.4 %
LYMPHOCYTES # BLD AUTO: 1.22 K/UL
LYMPHOCYTES NFR BLD AUTO: 18.3 %
MAN DIFF?: NORMAL
MCHC RBC-ENTMCNC: 23.7 PG
MCHC RBC-ENTMCNC: 31.6 GM/DL
MCV RBC AUTO: 75.1 FL
MONOCYTES # BLD AUTO: 0.47 K/UL
MONOCYTES NFR BLD AUTO: 7 %
NEUTROPHILS # BLD AUTO: 4.87 K/UL
NEUTROPHILS NFR BLD AUTO: 73.1 %
PLATELET # BLD AUTO: 372 K/UL
POTASSIUM SERPL-SCNC: 4.2 MMOL/L
PROT SERPL-MCNC: 7.4 G/DL
RBC # BLD: 5.14 M/UL
RBC # FLD: 16.2 %
SODIUM SERPL-SCNC: 135 MMOL/L
WBC # FLD AUTO: 6.67 K/UL

## 2024-11-19 ENCOUNTER — RX RENEWAL (OUTPATIENT)
Age: 49
End: 2024-11-19

## 2025-02-10 ENCOUNTER — APPOINTMENT (OUTPATIENT)
Dept: RHEUMATOLOGY | Facility: CLINIC | Age: 50
End: 2025-02-10
Payer: COMMERCIAL

## 2025-02-10 VITALS
OXYGEN SATURATION: 98 % | TEMPERATURE: 98 F | BODY MASS INDEX: 26.52 KG/M2 | RESPIRATION RATE: 14 BRPM | HEART RATE: 86 BPM | SYSTOLIC BLOOD PRESSURE: 123 MMHG | WEIGHT: 165 LBS | DIASTOLIC BLOOD PRESSURE: 83 MMHG | HEIGHT: 66 IN

## 2025-02-10 VITALS
HEART RATE: 86 BPM | WEIGHT: 153 LBS | TEMPERATURE: 98 F | HEIGHT: 66 IN | SYSTOLIC BLOOD PRESSURE: 123 MMHG | RESPIRATION RATE: 14 BRPM | BODY MASS INDEX: 24.59 KG/M2 | OXYGEN SATURATION: 98 % | DIASTOLIC BLOOD PRESSURE: 83 MMHG

## 2025-02-10 DIAGNOSIS — L40.50 ARTHROPATHIC PSORIASIS, UNSPECIFIED: ICD-10-CM

## 2025-02-10 DIAGNOSIS — Z79.899 OTHER LONG TERM (CURRENT) DRUG THERAPY: ICD-10-CM

## 2025-02-10 DIAGNOSIS — L40.9 PSORIASIS, UNSPECIFIED: ICD-10-CM

## 2025-02-10 PROCEDURE — G2211 COMPLEX E/M VISIT ADD ON: CPT | Mod: NC

## 2025-02-10 PROCEDURE — 99214 OFFICE O/P EST MOD 30 MIN: CPT | Mod: 25

## 2025-02-10 PROCEDURE — 90670 PCV13 VACCINE IM: CPT

## 2025-02-10 PROCEDURE — G0009: CPT

## 2025-02-20 LAB
ALBUMIN SERPL ELPH-MCNC: 4.8 G/DL
ALP BLD-CCNC: 44 U/L
ALT SERPL-CCNC: 25 U/L
ANION GAP SERPL CALC-SCNC: 16 MMOL/L
AST SERPL-CCNC: 20 U/L
BASOPHILS # BLD AUTO: 0.07 K/UL
BASOPHILS NFR BLD AUTO: 1.1 %
BILIRUB SERPL-MCNC: 0.3 MG/DL
BUN SERPL-MCNC: 15 MG/DL
CALCIUM SERPL-MCNC: 9.9 MG/DL
CHLORIDE SERPL-SCNC: 97 MMOL/L
CO2 SERPL-SCNC: 24 MMOL/L
CREAT SERPL-MCNC: 0.44 MG/DL
CRP SERPL-MCNC: <3 MG/L
EGFR: 120 ML/MIN/1.73M2
EOSINOPHIL # BLD AUTO: 0.03 K/UL
EOSINOPHIL NFR BLD AUTO: 0.5 %
ERYTHROCYTE [SEDIMENTATION RATE] IN BLOOD BY WESTERGREN METHOD: 33 MM/HR
GLUCOSE SERPL-MCNC: 160 MG/DL
HCT VFR BLD CALC: 41.6 %
HGB BLD-MCNC: 12.7 G/DL
IMM GRANULOCYTES NFR BLD AUTO: 0.5 %
LYMPHOCYTES # BLD AUTO: 1.17 K/UL
LYMPHOCYTES NFR BLD AUTO: 18.3 %
MAN DIFF?: NORMAL
MCHC RBC-ENTMCNC: 23.4 PG
MCHC RBC-ENTMCNC: 30.5 G/DL
MCV RBC AUTO: 76.8 FL
MONOCYTES # BLD AUTO: 0.57 K/UL
MONOCYTES NFR BLD AUTO: 8.9 %
NEUTROPHILS # BLD AUTO: 4.52 K/UL
NEUTROPHILS NFR BLD AUTO: 70.7 %
PLATELET # BLD AUTO: 416 K/UL
POTASSIUM SERPL-SCNC: 4.4 MMOL/L
PROT SERPL-MCNC: 7.7 G/DL
RBC # BLD: 5.42 M/UL
RBC # FLD: 15.2 %
SODIUM SERPL-SCNC: 137 MMOL/L
WBC # FLD AUTO: 6.39 K/UL

## 2025-04-23 ENCOUNTER — RX RENEWAL (OUTPATIENT)
Age: 50
End: 2025-04-23

## 2025-05-27 ENCOUNTER — APPOINTMENT (OUTPATIENT)
Dept: RHEUMATOLOGY | Facility: CLINIC | Age: 50
End: 2025-05-27
Payer: COMMERCIAL

## 2025-05-27 ENCOUNTER — NON-APPOINTMENT (OUTPATIENT)
Age: 50
End: 2025-05-27

## 2025-05-27 VITALS
HEIGHT: 66 IN | OXYGEN SATURATION: 98 % | DIASTOLIC BLOOD PRESSURE: 75 MMHG | BODY MASS INDEX: 24.27 KG/M2 | RESPIRATION RATE: 14 BRPM | WEIGHT: 151 LBS | SYSTOLIC BLOOD PRESSURE: 120 MMHG | TEMPERATURE: 98 F | HEART RATE: 89 BPM

## 2025-05-27 DIAGNOSIS — B16.9 ACUTE HEPATITIS B W/OUT DELTA-AGENT AND W/OUT HEPATIC COMA: ICD-10-CM

## 2025-05-27 DIAGNOSIS — Z79.899 OTHER LONG TERM (CURRENT) DRUG THERAPY: ICD-10-CM

## 2025-05-27 DIAGNOSIS — L40.9 PSORIASIS, UNSPECIFIED: ICD-10-CM

## 2025-05-27 DIAGNOSIS — L40.50 ARTHROPATHIC PSORIASIS, UNSPECIFIED: ICD-10-CM

## 2025-05-27 PROCEDURE — 99214 OFFICE O/P EST MOD 30 MIN: CPT

## 2025-05-27 PROCEDURE — G2211 COMPLEX E/M VISIT ADD ON: CPT | Mod: NC

## 2025-06-03 ENCOUNTER — RX RENEWAL (OUTPATIENT)
Age: 50
End: 2025-06-03

## 2025-06-04 LAB
ALBUMIN SERPL ELPH-MCNC: 4.9 G/DL
ALP BLD-CCNC: 44 U/L
ALT SERPL-CCNC: 35 U/L
ANION GAP SERPL CALC-SCNC: 18 MMOL/L
AST SERPL-CCNC: 22 U/L
BASOPHILS # BLD AUTO: 0.06 K/UL
BASOPHILS NFR BLD AUTO: 1 %
BILIRUB SERPL-MCNC: 0.5 MG/DL
BUN SERPL-MCNC: 20 MG/DL
CALCIUM SERPL-MCNC: 9.7 MG/DL
CHLORIDE SERPL-SCNC: 97 MMOL/L
CO2 SERPL-SCNC: 20 MMOL/L
CREAT SERPL-MCNC: 0.5 MG/DL
CRP SERPL-MCNC: <3 MG/L
EGFRCR SERPLBLD CKD-EPI 2021: 116 ML/MIN/1.73M2
EOSINOPHIL # BLD AUTO: 0.01 K/UL
EOSINOPHIL NFR BLD AUTO: 0.2 %
GLUCOSE SERPL-MCNC: 268 MG/DL
HBV CORE IGG+IGM SER QL: NONREACTIVE
HBV SURFACE AB SER QL: NONREACTIVE
HBV SURFACE AG SER QL: NONREACTIVE
HCT VFR BLD CALC: 40.9 %
HCV AB SER QL: NONREACTIVE
HCV S/CO RATIO: 0.14 S/CO
HGB BLD-MCNC: 12.5 G/DL
IMM GRANULOCYTES NFR BLD AUTO: 0.2 %
LYMPHOCYTES # BLD AUTO: 1.02 K/UL
LYMPHOCYTES NFR BLD AUTO: 17.8 %
M TB IFN-G BLD-IMP: NEGATIVE
MAN DIFF?: NORMAL
MCHC RBC-ENTMCNC: 23.5 PG
MCHC RBC-ENTMCNC: 30.6 G/DL
MCV RBC AUTO: 77 FL
MONOCYTES # BLD AUTO: 0.49 K/UL
MONOCYTES NFR BLD AUTO: 8.5 %
NEUTROPHILS # BLD AUTO: 4.15 K/UL
NEUTROPHILS NFR BLD AUTO: 72.3 %
PLATELET # BLD AUTO: 363 K/UL
POTASSIUM SERPL-SCNC: 4 MMOL/L
PROT SERPL-MCNC: 7.7 G/DL
QUANTIFERON TB PLUS MITOGEN MINUS NIL: >10 IU/ML
QUANTIFERON TB PLUS NIL: 0.06 IU/ML
QUANTIFERON TB PLUS TB1 MINUS NIL: 0.03 IU/ML
QUANTIFERON TB PLUS TB2 MINUS NIL: 0.05 IU/ML
RBC # BLD: 5.31 M/UL
RBC # FLD: 16 %
RHEUMATOID FACT SER QL: <10 IU/ML
SODIUM SERPL-SCNC: 136 MMOL/L
WBC # FLD AUTO: 5.74 K/UL

## 2025-06-12 ENCOUNTER — RX RENEWAL (OUTPATIENT)
Age: 50
End: 2025-06-12

## 2025-09-02 ENCOUNTER — RX RENEWAL (OUTPATIENT)
Age: 50
End: 2025-09-02

## 2025-09-09 ENCOUNTER — APPOINTMENT (OUTPATIENT)
Dept: RHEUMATOLOGY | Facility: CLINIC | Age: 50
End: 2025-09-09